# Patient Record
Sex: MALE | Race: WHITE | URBAN - METROPOLITAN AREA
[De-identification: names, ages, dates, MRNs, and addresses within clinical notes are randomized per-mention and may not be internally consistent; named-entity substitution may affect disease eponyms.]

---

## 2017-03-24 ENCOUNTER — TELEPHONE (OUTPATIENT)
Dept: FAMILY MEDICINE | Facility: OTHER | Age: 47
End: 2017-03-24

## 2017-03-24 DIAGNOSIS — H10.023 PINK EYE DISEASE OF BOTH EYES: Primary | ICD-10-CM

## 2017-03-24 RX ORDER — POLYMYXIN B SULFATE AND TRIMETHOPRIM 1; 10000 MG/ML; [USP'U]/ML
1 SOLUTION OPHTHALMIC EVERY 4 HOURS
Qty: 1 BOTTLE | Refills: 0 | Status: SHIPPED | OUTPATIENT
Start: 2017-03-24 | End: 2017-03-31

## 2017-03-24 NOTE — TELEPHONE ENCOUNTER
Patient walked into clinic with concerns of possible pink eye. Stat that is has been watery and itchy for a while.  No mattery.      RN Conjunctivitis Protocol: Ages 2 and older  Jameson Armendariz is a 46 year old male is having symptoms reviewed for possible conjunctivitis.      ASSESSMENT/PLAN:  Allergy to Sulfa?  No   1.  Medication Indicated: YES - POLYTRIM 87512-3.1 UNIT/ML-% OP Sol, 1 drop in affected eye(s) 4 times daily while awake x 7 days. .    2.  Education regarding contact precautions, hand washing, avoid wearing contacts until finished with drops or until symptoms resolve, contact clinic if there is no improvement of symptoms within 3 days and if develops eye pain or sensitivity to light.   3.  Follow-up: Schedule an appointment with a provider if symptoms do not improve after 3 days of antibiotics or if symptoms return after antibiotic therapy is complete.  4.  Patient verbalized understanding of this plan and is agreeable.    SUBJECTIVE:     Conjunctival symptoms: redness and itching   Location: both eyes  Onset: few days ago maybe longer   In addition notes: None  Contact Lens use?: No  Complicating factors    Reports:NONE  Denies:Vision changes; not cleared with blinking, Recent  history of eye trauma, Sensitivity to light, Severe eye pain, Fever: none, Eyelid symptoms None      OBJECTIVE:      NURSING PLAN: Huddle with provider, plan includes ok for drops, if not improving, needs to be seen    EDUCATION:      RECOMMENDED DISPOSITION:  Home care advice - wash hands well, use drops as directed.  Could also try Otc antihistamine for watery eyes  Will comply with recommendation: Yes  Encounter handled by: Nurse Triage.     Zbigniew Howard RN